# Patient Record
Sex: MALE | Race: WHITE | NOT HISPANIC OR LATINO
[De-identification: names, ages, dates, MRNs, and addresses within clinical notes are randomized per-mention and may not be internally consistent; named-entity substitution may affect disease eponyms.]

---

## 2021-01-05 ENCOUNTER — APPOINTMENT (OUTPATIENT)
Dept: INFECTIOUS DISEASE | Facility: CLINIC | Age: 51
End: 2021-01-05
Payer: MEDICARE

## 2021-01-05 VITALS
OXYGEN SATURATION: 95 % | DIASTOLIC BLOOD PRESSURE: 68 MMHG | SYSTOLIC BLOOD PRESSURE: 132 MMHG | TEMPERATURE: 98.7 F | HEART RATE: 75 BPM

## 2021-01-05 VITALS — WEIGHT: 140 LBS

## 2021-01-05 DIAGNOSIS — M00.9 PYOGENIC ARTHRITIS, UNSPECIFIED: ICD-10-CM

## 2021-01-05 PROBLEM — Z00.00 ENCOUNTER FOR PREVENTIVE HEALTH EXAMINATION: Status: ACTIVE | Noted: 2021-01-05

## 2021-01-05 PROCEDURE — 36415 COLL VENOUS BLD VENIPUNCTURE: CPT

## 2021-01-05 PROCEDURE — 99205 OFFICE O/P NEW HI 60 MIN: CPT | Mod: 25

## 2021-01-05 RX ORDER — OXYCODONE 10 MG/1
10 TABLET ORAL
Refills: 0 | Status: ACTIVE | COMMUNITY

## 2021-01-05 RX ORDER — GABAPENTIN 800 MG/1
800 TABLET, COATED ORAL 4 TIMES DAILY
Refills: 0 | Status: ACTIVE | COMMUNITY

## 2021-01-05 RX ORDER — DULOXETINE HYDROCHLORIDE 60 MG/1
60 CAPSULE, DELAYED RELEASE ORAL
Refills: 0 | Status: ACTIVE | COMMUNITY
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
## 2021-01-05 NOTE — ASSESSMENT
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 f/u with ortho\par 3. keflex 500 mg po tid\par 4. r/v 2 weeks\par 5. cbc, cmp ,esr, crp [Treatment Education] : treatment education [Treatment Adherence] : treatment adherence [Drug Interactions / Side Effects] : drug interactions/side effects

## 2021-01-05 NOTE — REVIEW OF SYSTEMS
[Fever] : no fever [Chills] : no chills [Body Aches] : no body aches [Difficulty Sleeping] : no difficulty sleeping [Feeling Tired] : not feeling tired [Normal Appetite] : appetite not normal  [Eye Pain] : no eye pain [Red Eyes] : eyes not red [Eyesight Problems] : no eyesight problems [Nasal Discharge] : no nasal discharge [Sore Throat] : no sore throat [Hoarseness] : no hoarseness [Chest Pain] : no chest pain [Palpitations] : no palpitations [Abdominal Pain] : no abdominal pain [Vomiting] : no vomiting [Constipation] : no constipation [Diarrhea] : no diarrhea [Dysuria] : no dysuria [Joint Pain] : joint pain [Joint Stiffness] : no joint stiffness [Skin Lesions] : no skin lesions [Skin Wound] : no skin wound [Confused] : no confusion [Dizziness] : no dizziness [Easy Bleeding] : no tendency for easy bleeding [FreeTextEntry9] : pain and mild discharge L elbow [de-identified] : as above

## 2021-01-05 NOTE — PHYSICAL EXAM
[General Appearance - Alert] : alert [General Appearance - In No Acute Distress] : in no acute distress [Sclera] : the sclera and conjunctiva were normal [PERRL With Normal Accommodation] : pupils were equal in size, round, reactive to light [Extraocular Movements] : extraocular movements were intact [Oropharynx] : the oropharynx was normal with no thrush [Neck Cervical Mass (___cm)] : no neck mass was observed [Respiration, Rhythm And Depth] : normal respiratory rhythm and effort [Exaggerated Use Of Accessory Muscles For Inspiration] : no accessory muscle use [Auscultation Breath Sounds / Voice Sounds] : lungs were clear to auscultation bilaterally [Heart Rate And Rhythm] : heart rate was normal and rhythm regular [Heart Sounds] : normal S1 and S2 [Murmurs] : no murmurs [Bowel Sounds] : normal bowel sounds [Abdomen Soft] : soft [Abdomen Tenderness] : non-tender [] : no hepato-splenomegaly [Abdomen Mass (___ Cm)] : no abdominal mass palpated [Costovertebral Angle Tenderness] : no CVA tenderness [Cervical Lymph Nodes Enlarged Posterior Bilaterally] : posterior cervical [Supraclavicular Lymph Nodes Enlarged Bilaterally] : supraclavicular [FreeTextEntry1] : scarring over the L elbow with some serous-purulent drainage [Skin Color & Pigmentation] : normal skin color and pigmentation [Oriented To Time, Place, And Person] : oriented to person, place, and time [Affect] : the affect was normal

## 2021-01-05 NOTE — REASON FOR VISIT
[Consultation] : a consultation visit [FreeTextEntry1] : Pt referred by Dr Simmons for culture results from I & D performed on 12/18 to left elbow. Area with redness and clear yellow drainage. Pt currently on Bactrim day 14/14.  Originally injured elbow from a fall end of October, was given Kelfex x 20 days by [facility] MD.

## 2021-01-06 ENCOUNTER — APPOINTMENT (OUTPATIENT)
Dept: INFECTIOUS DISEASE | Facility: CLINIC | Age: 51
End: 2021-01-06

## 2021-01-19 ENCOUNTER — NON-APPOINTMENT (OUTPATIENT)
Age: 51
End: 2021-01-19

## 2021-01-19 ENCOUNTER — APPOINTMENT (OUTPATIENT)
Dept: INFECTIOUS DISEASE | Facility: CLINIC | Age: 51
End: 2021-01-19

## 2021-02-16 ENCOUNTER — APPOINTMENT (OUTPATIENT)
Dept: INFECTIOUS DISEASE | Facility: CLINIC | Age: 51
End: 2021-02-16
Payer: MEDICARE

## 2021-02-16 VITALS — OXYGEN SATURATION: 95 % | HEART RATE: 82 BPM | DIASTOLIC BLOOD PRESSURE: 62 MMHG | SYSTOLIC BLOOD PRESSURE: 120 MMHG

## 2021-02-16 VITALS — TEMPERATURE: 99.3 F

## 2021-02-16 PROCEDURE — 99214 OFFICE O/P EST MOD 30 MIN: CPT | Mod: 25

## 2021-02-16 PROCEDURE — 36415 COLL VENOUS BLD VENIPUNCTURE: CPT

## 2021-02-16 RX ORDER — CIPROFLOXACIN HYDROCHLORIDE 500 MG/1
500 TABLET, FILM COATED ORAL TWICE DAILY
Qty: 60 | Refills: 1 | Status: COMPLETED | COMMUNITY
Start: 2021-02-16

## 2021-02-16 NOTE — REASON FOR VISIT
[Follow-Up: _____] : a [unfilled] follow-up visit [FreeTextEntry1] : Pt returned s/p surgery last week- hardware removal and debridement by Dr Simmons.  No longer taking Kelfex, on Bactrim BID since procedure.
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
## 2021-02-16 NOTE — PHYSICAL EXAM
[General Appearance - Alert] : alert [General Appearance - In No Acute Distress] : in no acute distress [Sclera] : the sclera and conjunctiva were normal [PERRL With Normal Accommodation] : pupils were equal in size, round, reactive to light [Extraocular Movements] : extraocular movements were intact [Outer Ear] : the ears and nose were normal in appearance [Oropharynx] : the oropharynx was normal with no thrush [Neck Appearance] : the appearance of the neck was normal [Neck Cervical Mass (___cm)] : no neck mass was observed [Respiration, Rhythm And Depth] : normal respiratory rhythm and effort [Exaggerated Use Of Accessory Muscles For Inspiration] : no accessory muscle use [Auscultation Breath Sounds / Voice Sounds] : lungs were clear to auscultation bilaterally [Heart Rate And Rhythm] : heart rate was normal and rhythm regular [Heart Sounds] : normal S1 and S2 [Murmurs] : no murmurs [Bowel Sounds] : normal bowel sounds [Abdomen Soft] : soft [Abdomen Tenderness] : non-tender [] : no hepato-splenomegaly [Abdomen Mass (___ Cm)] : no abdominal mass palpated [Costovertebral Angle Tenderness] : no CVA tenderness [Cervical Lymph Nodes Enlarged Posterior Bilaterally] : posterior cervical [Supraclavicular Lymph Nodes Enlarged Bilaterally] : supraclavicular [FreeTextEntry1] : surgical wound with sutures in situ, no erythema or drainage [Skin Color & Pigmentation] : normal skin color and pigmentation [Cranial Nerves] : cranial nerves 2-12 were intact [Oriented To Time, Place, And Person] : oriented to person, place, and time [Affect] : the affect was normal

## 2021-02-16 NOTE — ASSESSMENT
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  Less likely, but cannot exclude this as a recurring source of infection\par Patient with osteomyelitis of the ulna with MSSA\par \par \par Plan \par \par 1: wound care\par 2. f/u with ortho\par 3. cipro 500 mg po bid x 4 weeks\par 4. r/v 2 weeks\par 5. cbc, cmp ,esr, crp\par 6. d/c bactrim [Treatment Education] : treatment education [Treatment Adherence] : treatment adherence [Drug Interactions / Side Effects] : drug interactions/side effects [Disclosure of Diagnosis] : disclosure of diagnosis

## 2021-02-16 NOTE — REVIEW OF SYSTEMS
[Limb Pain] : limb pain [Fever] : no fever [Chills] : no chills [Body Aches] : no body aches [Difficulty Sleeping] : no difficulty sleeping [Feeling Sick] : not feeling sick [Eye Pain] : no eye pain [Discharge From Eyes] : no purulent discharge from the eyes [Nasal Discharge] : no nasal discharge [Sore Throat] : no sore throat [Chest Pain] : no chest pain [Palpitations] : no palpitations [Lower Ext Edema] : no extremity edema [Shortness Of Breath] : no shortness of breath [Wheezing] : no wheezing [Cough] : no cough [SOB on Exertion] : no shortness of breath during exertion [Sputum] : not coughing up ~M sputum [Pleuritic Chest Pain] : no pleuritic chest pain [Vomiting] : no vomiting [Constipation] : no constipation [Dysuria] : no dysuria [FreeTextEntry9] : mild pain at

## 2021-02-19 ENCOUNTER — NON-APPOINTMENT (OUTPATIENT)
Age: 51
End: 2021-02-19

## 2021-03-02 ENCOUNTER — APPOINTMENT (OUTPATIENT)
Dept: INFECTIOUS DISEASE | Facility: CLINIC | Age: 51
End: 2021-03-02
Payer: MEDICARE

## 2021-03-02 VITALS
HEART RATE: 82 BPM | OXYGEN SATURATION: 96 % | DIASTOLIC BLOOD PRESSURE: 82 MMHG | TEMPERATURE: 98.7 F | SYSTOLIC BLOOD PRESSURE: 120 MMHG

## 2021-03-02 DIAGNOSIS — M86.9 OSTEOMYELITIS, UNSPECIFIED: ICD-10-CM

## 2021-03-02 PROCEDURE — 99214 OFFICE O/P EST MOD 30 MIN: CPT | Mod: 25

## 2021-03-02 PROCEDURE — 36415 COLL VENOUS BLD VENIPUNCTURE: CPT

## 2021-03-02 NOTE — ASSESSMENT
[FreeTextEntry1] : patient s/p chronic draining sinus of the L elbow\par path consistent with chronic osteomyelitis\par cultures with MSSA\par \par he has had almost all of the hardware removed\par single partial screw retained as it could not be removed.  Less likely, but cannot exclude this as a recurring source of infection\par Patient with osteomyelitis of the ulna with MSSA\par Inflammatory markers remain moderately elevated\par will treat until they return to his baseline\par \par \par Plan \par \par 1: wound care\par 2. f/u with ortho\par 3. cipro 500 mg po bid x 4 weeks total depending upon ESR, CRP\par 4. r/v 2 weeks\par 5. cbc, cmp ,esr, crp\par  [Treatment Education] : treatment education [Treatment Adherence] : treatment adherence [Drug Interactions / Side Effects] : drug interactions/side effects

## 2021-03-02 NOTE — REVIEW OF SYSTEMS
[Fever] : no fever [Chills] : no chills [Body Aches] : no body aches [Difficulty Sleeping] : no difficulty sleeping [Feeling Sick] : not feeling sick [Feeling Tired] : not feeling tired [Eye Pain] : no eye pain [Eyesight Problems] : no eyesight problems [Loss Of Hearing] : no hearing loss [Nasal Discharge] : no nasal discharge [Sore Throat] : no sore throat [Chest Pain] : no chest pain [Palpitations] : no palpitations [Leg Claudication] : no intermittent leg claudication [Lower Ext Edema] : no extremity edema [Shortness Of Breath] : no shortness of breath [Wheezing] : no wheezing [Cough] : no cough [SOB on Exertion] : no shortness of breath during exertion [Sputum] : not coughing up ~M sputum [Pleuritic Chest Pain] : no pleuritic chest pain [Abdominal Pain] : no abdominal pain [Vomiting] : no vomiting [Constipation] : no constipation [Diarrhea] : no diarrhea [Dysuria] : no dysuria [Penile Discharge] : no penile discharge [Hesitancy] : no urinary hesitancy [Nocturia] : no nocturia [Joint Pain] : no joint pain [Joint Swelling] : no joint swelling [Joint Stiffness] : joint stiffness [Skin Lesions] : no skin lesions [Skin Wound] : no skin wound [Confused] : no confusion [Dizziness] : no dizziness [Anxiety] : no anxiety [FreeTextEntry9] : left elbow [de-identified] : wound without drainage

## 2021-03-02 NOTE — PHYSICAL EXAM
[General Appearance - Alert] : alert [General Appearance - In No Acute Distress] : in no acute distress [General Appearance - Well Nourished] : well nourished [Sclera] : the sclera and conjunctiva were normal [PERRL With Normal Accommodation] : pupils were equal in size, round, reactive to light [Extraocular Movements] : extraocular movements were intact [Outer Ear] : the ears and nose were normal in appearance [Oropharynx] : the oropharynx was normal with no thrush [Neck Appearance] : the appearance of the neck was normal [Neck Cervical Mass (___cm)] : no neck mass was observed [Respiration, Rhythm And Depth] : normal respiratory rhythm and effort [Exaggerated Use Of Accessory Muscles For Inspiration] : no accessory muscle use [Auscultation Breath Sounds / Voice Sounds] : lungs were clear to auscultation bilaterally [Heart Rate And Rhythm] : heart rate was normal and rhythm regular [Heart Sounds] : normal S1 and S2 [Murmurs] : no murmurs [Bowel Sounds] : normal bowel sounds [Abdomen Soft] : soft [Abdomen Tenderness] : non-tender [] : no hepato-splenomegaly [Abdomen Mass (___ Cm)] : no abdominal mass palpated [Costovertebral Angle Tenderness] : no CVA tenderness [Cervical Lymph Nodes Enlarged Posterior Bilaterally] : posterior cervical [Supraclavicular Lymph Nodes Enlarged Bilaterally] : supraclavicular [Musculoskeletal - Swelling] : no joint swelling [FreeTextEntry1] : surgical wound with sutures removed, no erythema or drainage [Skin Color & Pigmentation] : normal skin color and pigmentation [Cranial Nerves] : cranial nerves 2-12 were intact [Oriented To Time, Place, And Person] : oriented to person, place, and time [Affect] : the affect was normal
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
## 2021-03-16 LAB
ALBUMIN SERPL ELPH-MCNC: 4.7 G/DL
ALP BLD-CCNC: 125 U/L
ALT SERPL-CCNC: 11 U/L
ANION GAP SERPL CALC-SCNC: 18 MMOL/L
AST SERPL-CCNC: 18 U/L
BASOPHILS # BLD AUTO: 0.11 K/UL
BASOPHILS NFR BLD AUTO: 1.3 %
BILIRUB SERPL-MCNC: <0.2 MG/DL
BUN SERPL-MCNC: 15 MG/DL
CALCIUM SERPL-MCNC: 9.5 MG/DL
CHLORIDE SERPL-SCNC: 105 MMOL/L
CO2 SERPL-SCNC: 18 MMOL/L
CREAT SERPL-MCNC: 0.87 MG/DL
CRP SERPL-MCNC: <3 MG/L
EOSINOPHIL # BLD AUTO: 0.11 K/UL
EOSINOPHIL NFR BLD AUTO: 1.3 %
ERYTHROCYTE [SEDIMENTATION RATE] IN BLOOD BY WESTERGREN METHOD: 14 MM/HR
GLUCOSE SERPL-MCNC: 88 MG/DL
HCT VFR BLD CALC: 40.1 %
HGB BLD-MCNC: 12.7 G/DL
IMM GRANULOCYTES NFR BLD AUTO: 0.6 %
LYMPHOCYTES # BLD AUTO: 1.73 K/UL
LYMPHOCYTES NFR BLD AUTO: 20.9 %
MAN DIFF?: NORMAL
MCHC RBC-ENTMCNC: 27.5 PG
MCHC RBC-ENTMCNC: 31.7 GM/DL
MCV RBC AUTO: 87 FL
MONOCYTES # BLD AUTO: 0.61 K/UL
MONOCYTES NFR BLD AUTO: 7.4 %
NEUTROPHILS # BLD AUTO: 5.66 K/UL
NEUTROPHILS NFR BLD AUTO: 68.5 %
PLATELET # BLD AUTO: 286 K/UL
POTASSIUM SERPL-SCNC: 4.3 MMOL/L
PROT SERPL-MCNC: 6.9 G/DL
RBC # BLD: 4.61 M/UL
RBC # FLD: 14.9 %
SODIUM SERPL-SCNC: 142 MMOL/L
WBC # FLD AUTO: 8.27 K/UL

## 2021-03-23 ENCOUNTER — APPOINTMENT (OUTPATIENT)
Dept: INFECTIOUS DISEASE | Facility: CLINIC | Age: 51
End: 2021-03-23

## 2021-03-23 VITALS
SYSTOLIC BLOOD PRESSURE: 110 MMHG | HEART RATE: 103 BPM | TEMPERATURE: 98.7 F | BODY MASS INDEX: 21.48 KG/M2 | HEIGHT: 69 IN | DIASTOLIC BLOOD PRESSURE: 80 MMHG | WEIGHT: 145 LBS | RESPIRATION RATE: 15 BRPM | OXYGEN SATURATION: 94 %

## 2021-04-19 NOTE — REASON FOR VISIT
[Follow-Up: _____] : a [unfilled] follow-up visit [FreeTextEntry1] : 3 week fu on Left elbow osteomyelitis \par bw results today \par wound doing well, following proper care protocol\par has not seen ortho yet

## 2024-02-10 ENCOUNTER — RESULT REVIEW (OUTPATIENT)
Age: 54
End: 2024-02-10